# Patient Record
(demographics unavailable — no encounter records)

---

## 2024-12-02 NOTE — PHYSICAL EXAM
[Normocephalic] : normocephalic [No Supraclavicular Adenopathy] : no supraclavicular adenopathy [EOMI] : extra ocular movement intact [Supple] : supple [No Cervical Adenopathy] : no cervical adenopathy [de-identified] : Left breast/axilla/supraclavicular area: No masses, discharge, or adenopathy Right breast/axilla/supraclavicular area-no evidence of recurrence

## 2024-12-02 NOTE — PHYSICAL EXAM
[Normocephalic] : normocephalic [No Supraclavicular Adenopathy] : no supraclavicular adenopathy [EOMI] : extra ocular movement intact [Supple] : supple [No Cervical Adenopathy] : no cervical adenopathy [de-identified] : Left breast/axilla/supraclavicular area: No masses, discharge, or adenopathy Right breast/axilla/supraclavicular area-no evidence of recurrence

## 2024-12-02 NOTE — PAST MEDICAL HISTORY
[Menarche Age ____] : age at menarche was [unfilled] [Menopause Age____] : age at menopause was [unfilled] [History of Hormone Replacement Treatment] : has a history of hormone replacement treatment [Total Preg ___] : G[unfilled] [FreeTextEntry6] : NO [FreeTextEntry7] : NO

## 2024-12-02 NOTE — HISTORY OF PRESENT ILLNESS
[FreeTextEntry1] : Patient is an 86 yo female here for breast cancer surveillance.  Patient has a history of R ILC 2020, ER/AR positive, HER2 negative, s/p R lumpectomy, right SLNB0/9 12/2020 with Dr. Saravia with positive and close margins. S/p R reexcision with Dr. Tanja Stewart 2/2021 yielding 0.8cm ILC, negative margins. S/p XRT with Dr. Butler. Currently on Anastrozole with Dr. Davis, tolerating it well. Oncotype RS 16.  Patient has no breast complaints. Denies palpable abnormalities of the breast, no skin changes/dimpling, no nipple discharge bilaterally. Patient has a family history of breast cancer in her mother diagnosed at 72, and sister diagnosed in her 60s.  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   11/6/18: B/l MG- YEIMI 11/12/19: B/l MG & US- YEIMI 11/17/20: B/l MG & US- distortion in the right breast in the 12 o'clock position and a right stereotactic core biopsy is recommended. 11/24/20: R stereo bx for architectural distortion- "dumbbell" clip. Invasive mammary carcinoma w/ lobular features. Concordant. 12/08/20: B/L MRI- dense. L 9:00 benign-appearing mass, consistent w/ fibroadenoma. R UIQ, 12:30, 10FN, 1.3 cm spiculated enhancing mass. The mass is anterior to the pec muscle w/ a thin layer of fat. R tissue marker within the pec. muscle. No evidence of skin, nipple, or chest wall involvement. BIRADS 6 12/08/20: R MG & US- R 12:00, 8FN, 1 cm mass consistent w/ known malignancy. Clip in pec. muscle, slightly lateral and separate from it. BIRADS 6. 12/16/20: R needle loc. lumpx & SNB- ILC with multiple foci measuring 1.8cm, LCIS. ILC involving inferior margin, 1mm from anterior, and 1.5mm from posterior margin. R anterior/medial margin yields ILC measuring 0.6 cm, LCIS. ILC involving new anterior/medial margin. 0/9 LN. ER/AR + (>90%), HER2- 2/9/21: R anterior/inferior margin re-excision lumpx path- 0.8 cm ILC adjacent to previous lumpx site, <0.1 cm separate focus of microinvasive lobular carcinoma, LCIS, negative margins. 11/4/21: B/l MG & US- heterogeneously dense. YEIMI. BI-RADS 2 11/10/22: B/l MG & US- heterogeneously dense. R stable posttreatment changes. L 1.3cm benign FA 9:00. US- B/l stable masses. BI-RADS 2 11/16/23: B/l MG &US- heterogeneously dense. R posttreatment changes. L stable mass central inner. US- R 0.4cm stable oval mass 10:00 2FN. L 1.3cm calcifed mass 9:00 6FN. BI-RADS 2. 12/2/24 B/L MG&US-heterogeneously dense.  R UOQ stable postsurgical distortion.  B/L coarse calcs, stable.  US-R 0.4 cm 10:00 2 CFN stable circumscribed hypoechoic mass.  L 9:00 6 CFN stable densely calcified mass.  BI-RADS 2

## 2024-12-02 NOTE — FAMILY HISTORY
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

## 2024-12-02 NOTE — PLAN
[TextEntry] : Reviewed imaging findings and discussed results with patient. She is to return in 1 year for annual B/l MG & US and office visit.  We reviewed patient's adjuvant therapy.  Patient should continue follow-up with medical oncology and continue taking anastrozole as per her med onc.

## 2024-12-02 NOTE — HISTORY OF PRESENT ILLNESS
[FreeTextEntry1] : Patient is an 84 yo female here for breast cancer surveillance.  Patient has a history of R ILC 2020, ER/OH positive, HER2 negative, s/p R lumpectomy, right SLNB0/9 12/2020 with Dr. Saravia with positive and close margins. S/p R reexcision with Dr. Tanja Stewart 2/2021 yielding 0.8cm ILC, negative margins. S/p XRT with Dr. Butler. Currently on Anastrozole with Dr. Davis, tolerating it well. Oncotype RS 16.  Patient has no breast complaints. Denies palpable abnormalities of the breast, no skin changes/dimpling, no nipple discharge bilaterally. Patient has a family history of breast cancer in her mother diagnosed at 72, and sister diagnosed in her 60s.  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   11/6/18: B/l MG- YEIMI 11/12/19: B/l MG & US- YEIMI 11/17/20: B/l MG & US- distortion in the right breast in the 12 o'clock position and a right stereotactic core biopsy is recommended. 11/24/20: R stereo bx for architectural distortion- "dumbbell" clip. Invasive mammary carcinoma w/ lobular features. Concordant. 12/08/20: B/L MRI- dense. L 9:00 benign-appearing mass, consistent w/ fibroadenoma. R UIQ, 12:30, 10FN, 1.3 cm spiculated enhancing mass. The mass is anterior to the pec muscle w/ a thin layer of fat. R tissue marker within the pec. muscle. No evidence of skin, nipple, or chest wall involvement. BIRADS 6 12/08/20: R MG & US- R 12:00, 8FN, 1 cm mass consistent w/ known malignancy. Clip in pec. muscle, slightly lateral and separate from it. BIRADS 6. 12/16/20: R needle loc. lumpx & SNB- ILC with multiple foci measuring 1.8cm, LCIS. ILC involving inferior margin, 1mm from anterior, and 1.5mm from posterior margin. R anterior/medial margin yields ILC measuring 0.6 cm, LCIS. ILC involving new anterior/medial margin. 0/9 LN. ER/OH + (>90%), HER2- 2/9/21: R anterior/inferior margin re-excision lumpx path- 0.8 cm ILC adjacent to previous lumpx site, <0.1 cm separate focus of microinvasive lobular carcinoma, LCIS, negative margins. 11/4/21: B/l MG & US- heterogeneously dense. YEIMI. BI-RADS 2 11/10/22: B/l MG & US- heterogeneously dense. R stable posttreatment changes. L 1.3cm benign FA 9:00. US- B/l stable masses. BI-RADS 2 11/16/23: B/l MG &US- heterogeneously dense. R posttreatment changes. L stable mass central inner. US- R 0.4cm stable oval mass 10:00 2FN. L 1.3cm calcifed mass 9:00 6FN. BI-RADS 2. 12/2/24 B/L MG&US-heterogeneously dense.  R UOQ stable postsurgical distortion.  B/L coarse calcs, stable.  US-R 0.4 cm 10:00 2 CFN stable circumscribed hypoechoic mass.  L 9:00 6 CFN stable densely calcified mass.  BI-RADS 2